# Patient Record
Sex: MALE | Race: ASIAN
[De-identification: names, ages, dates, MRNs, and addresses within clinical notes are randomized per-mention and may not be internally consistent; named-entity substitution may affect disease eponyms.]

---

## 2017-06-09 ENCOUNTER — HOSPITAL ENCOUNTER (EMERGENCY)
Dept: HOSPITAL 76 - ED | Age: 32
Discharge: HOME | End: 2017-06-09
Payer: MEDICAID

## 2017-06-09 VITALS — SYSTOLIC BLOOD PRESSURE: 124 MMHG | DIASTOLIC BLOOD PRESSURE: 88 MMHG

## 2017-06-09 DIAGNOSIS — B97.89: ICD-10-CM

## 2017-06-09 DIAGNOSIS — R03.0: ICD-10-CM

## 2017-06-09 DIAGNOSIS — Z87.891: ICD-10-CM

## 2017-06-09 DIAGNOSIS — J06.9: ICD-10-CM

## 2017-06-09 DIAGNOSIS — H66.002: ICD-10-CM

## 2017-06-09 DIAGNOSIS — J02.9: Primary | ICD-10-CM

## 2017-06-09 LAB — RAPID STREP SCREEN REAGENT QC: YELLOW

## 2017-06-09 PROCEDURE — 87070 CULTURE OTHR SPECIMN AEROBIC: CPT

## 2017-06-09 PROCEDURE — 87430 STREP A AG IA: CPT

## 2017-06-09 PROCEDURE — 99283 EMERGENCY DEPT VISIT LOW MDM: CPT

## 2017-06-09 NOTE — ED PHYSICIAN DOCUMENTATION
History of Present Illness





- Stated complaint


Stated Complaint: CONGESTION/EAR PX





- Chief complaint


Chief Complaint: Heent





- Additonal information


Additional information: 





hx from pt


31 male


5 days of cough congestion ear pain sore throat


no travel


sick co workers








Review of Systems


Constitutional: denies: Fever


Ears: reports: Ear pain


Nose: reports: Congestion


Throat: reports: Sore throat


Respiratory: reports: Cough


Immunocompromised: denies: Immunocompromised





PD PAST MEDICAL HISTORY





- Past Medical History


Past Medical History: No





- Past Surgical History


Past Surgical History: No


HEENT: Tonsil/Adenoidectomy





- Present Medications


Home Medications: 


 Ambulatory Orders











 Medication  Instructions  Recorded  Confirmed


 


Azithromycin [Zithromax] 250 mg PO DAILY #6 tablet 06/09/17 


 


Benzonatate [Tessalon] 100 mg PO TID PRN #20 capsule 06/09/17 


 


Fluticasone [Flonase] 1 sprays KEMAL BID PRN #1 bottle 06/09/17 














- Allergies


Allergies/Adverse Reactions: 


 Allergies











Allergy/AdvReac Type Severity Reaction Status Date / Time


 


Penicillins Allergy  Hives Verified 06/09/17 12:40


 


sulfamethoxazole Allergy  Hives Verified 06/09/17 12:40





[From Septra]     


 


trimethoprim [From Septra] Allergy  Hives Verified 06/09/17 12:40














- Social History


Does the pt smoke?: No


Smoking Status: Former smoker


Does the pt drink ETOH?: Yes


ETOH Use: Beer


Does the pt have substance abuse?: No





- Immunizations


Immunizations are current?: Yes





- POLST


Patient has POLST: No





PD ED PE NORMAL





- Vitals


Vital signs reviewed: Yes





- General


General: Alert and oriented X 3





- HEENT


HEENT: PERRL, Moist mucous membranes.  No: Ears normal (L AOM - dull red no 

landmarks, R benigh), Pharynx benign (erythema no exudate or swelling)





- Neck


Neck: Supple, no meningeal sign





- Cardiac


Cardiac: RRR





- Respiratory


Respiratory: No respiratory distress, Clear bilaterally





- Neuro


Neuro: Alert and oriented X 3





Results





- Vitals


Vitals: 





 Vital Signs - 24 hr











  06/09/17





  12:35


 


Temperature 36.5 C


 


Heart Rate 88


 


Respiratory 17





Rate 


 


Blood Pressure 124/88 H


 


O2 Saturation 96








 Oxygen











O2 Source                      Room air

















Departure





- Departure


Disposition: 01 Home, Self Care


Clinical Impression: 


Pharyngitis


Qualifiers:


 Pharyngitis/tonsillitis etiology: unspecified etiology Qualified Code(s): 

J02.9 - Acute pharyngitis, unspecified





Otitis media


Qualifiers:


 Otitis media type: suppurative Laterality: left Chronicity: acute Recurrence: 

not specified as recurrent Spontaneous tympanic membrane rupture: without 

spontaneous rupture Qualified Code(s): H66.002 - Acute suppurative otitis media 

without spontaneous rupture of ear drum, left ear





URI (upper respiratory infection)


Qualifiers:


 URI type: unspecified viral URI Qualified Code(s): J06.9 - Acute upper 

respiratory infection, unspecified; B97.89 - Other viral agents as the cause of 

diseases classified elsewhere


Condition: Good


Instructions:  ED Otitis Media Acute Adult


Prescriptions: 


Fluticasone [Flonase] 1 sprays KEMAL BID PRN #1 bottle


 PRN Reason: allergies


Benzonatate [Tessalon] 100 mg PO TID PRN #20 capsule


 PRN Reason: to ease cough


Azithromycin [Zithromax] 250 mg PO DAILY #6 tablet


Comments: 


Motrin and/or tylenol as needed for the pain





And please follow up with your PMD about your blood pressure - it was high today


Forms:  Activity restrictions

## 2018-01-22 ENCOUNTER — HOSPITAL ENCOUNTER (OUTPATIENT)
Dept: HOSPITAL 76 - DI | Age: 33
Discharge: HOME | End: 2018-01-22
Attending: FAMILY MEDICINE
Payer: MEDICAID

## 2018-01-22 DIAGNOSIS — N50.82: Primary | ICD-10-CM

## 2018-01-22 PROCEDURE — 76870 US EXAM SCROTUM: CPT

## 2018-01-22 NOTE — ULTRASOUND REPORT
DATE OF SERVICE: 01/22/2018

 

SCROTAL DUPLEX:  01/22/2018

 

CLINICAL INDICATION:  Pain, palpable abnormality, left side.

 

FINDINGS: The right testicle measures 4.6 x 3.2 x 2.3 cm, and the left testicle measures 4.2 x 

3.0 x 2.2 cm.  Both testicles demonstrate normal flow and echotexture.  No hydrocele, 

varicocele, or hernia is identified.  The epididymides are unremarkable.  The patient had a 

difficult time localizing a palpable abnormality.

 

IMPRESSION:  NORMAL SCROTAL DUPLEX.

 

 

DD: 01/22/2018 15:24

TD: 01/22/2018 19:57

Job #: [id number]

## 2018-02-24 ENCOUNTER — HOSPITAL ENCOUNTER (EMERGENCY)
Dept: HOSPITAL 76 - ED | Age: 33
Discharge: HOME | End: 2018-02-24
Payer: COMMERCIAL

## 2018-02-24 VITALS — DIASTOLIC BLOOD PRESSURE: 83 MMHG | SYSTOLIC BLOOD PRESSURE: 133 MMHG
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
## 2018-02-24 DIAGNOSIS — Z23: ICD-10-CM

## 2018-02-24 DIAGNOSIS — S61.213A: Primary | ICD-10-CM

## 2018-02-24 DIAGNOSIS — Z87.891: ICD-10-CM

## 2018-02-24 PROCEDURE — 90471 IMMUNIZATION ADMIN: CPT

## 2018-02-24 PROCEDURE — 1040M: CPT

## 2018-02-24 PROCEDURE — 99283 EMERGENCY DEPT VISIT LOW MDM: CPT

## 2018-02-24 PROCEDURE — 99282 EMERGENCY DEPT VISIT SF MDM: CPT

## 2018-02-24 PROCEDURE — 90715 TDAP VACCINE 7 YRS/> IM: CPT

## 2018-02-24 NOTE — ED PHYSICIAN DOCUMENTATION
PD HPI UPPER EXT INJURY





- Stated complaint


Stated Complaint: FINGER LAC





- Chief complaint


Chief Complaint: Laceration





- History obtained from


History obtained from: Patient





- History of Present Illness


Location: Left, Finger (3rd)


Type of injury: Other (avulsion)


Where injury occurred: Work ([employer])


Timing - onset: How many hours ago (1)


Timing - duration: Hours (1)


Timing - details: Abrupt onset


Pain level max: 3


Pain level now: 2


Improved by: Rest


Worsened by: Moving, Palpating


Associated symptoms: No: Weakness, Numbness, Tingling, Swelling


Contributing factors: No: Anticoagulated


Similar symptoms before: Has not had sx before





- Additonal information


Additional information: 





states cut finger on something as he was throwing it away at work.





Review of Systems


Constitutional: denies: Fever


GI: denies: Vomiting


Neurologic: denies: Focal weakness, Numbness





PD PAST MEDICAL HISTORY





- Past Medical History


Past Medical History: No





- Past Surgical History


Past Surgical History: No


HEENT: Tonsil/Adenoidectomy





- Present Medications


Home Medications: 


 Ambulatory Orders











 Medication  Instructions  Recorded  Confirmed


 


Azithromycin [Zithromax] 250 mg PO DAILY #6 tablet 06/09/17 


 


Benzonatate [Tessalon] 100 mg PO TID PRN #20 capsule 06/09/17 


 


Fluticasone [Flonase] 1 sprays KEMAL BID PRN #1 bottle 06/09/17 














- Allergies


Allergies/Adverse Reactions: 


 Allergies











Allergy/AdvReac Type Severity Reaction Status Date / Time


 


Penicillins Allergy  Hives Verified 02/24/18 21:24


 


sulfamethoxazole Allergy  Hives Verified 02/24/18 21:24





[From Septra]     


 


trimethoprim [From Septra] Allergy  Hives Verified 02/24/18 21:24














- Social History


Does the pt smoke?: No


Smoking Status: Former smoker


Does the pt drink ETOH?: Yes


Does the pt have substance abuse?: No





- Immunizations


Immunizations are current?: Yes


Immunizations: TDAP >10years/unknown





- POLST


Patient has POLST: No





PD ED PE NORMAL





- Vitals


Vital signs reviewed: Yes





- General


General: Alert and oriented X 3, No acute distress





- Derm


Derm: Warm and dry





- Extremities


Extremities: Other (L  hand - 3rd digit 0.2x0.3cm subcutaneous avulsion. NVI. 

skin is completely removed. )





- Neuro


Neuro: Alert and oriented X 3





Results





- Vitals


Vitals: 


 Vital Signs - 24 hr











  02/24/18





  21:20


 


Temperature 36.2 C L


 


Heart Rate 96


 


Respiratory 16





Rate 


 


Blood Pressure 133/83 H


 


O2 Saturation 97








 Oxygen











O2 Source                      Room air

















PD MEDICAL DECISION MAKING





- ED course


Complexity details: re-evaluated patient, considered differential, d/w patient


ED course: 





Patient is a 32-year-old male with an avulsion to the left third digit.  This 

was cleansed well, covered with Gelfoam and then wrapped in tube gauze.  

Warnings of infection and instructions on wound care given at bedside. Also 

counseled on how to minimize scarring.  Tdap given Patient counseled regarding 

signs and symptoms for which I believe and urgent re-evaluation would be 

necessary. Patient with good understanding of and agreement to plan and is 

comfortable going home at this time





This document was made in part using voice recognition software. While efforts 

are made to proofread this document, sound alike and grammatical errors may 

occur.





Departure





- Departure


Disposition: 01 Home, Self Care


Clinical Impression: 


Avulsion of finger


Qualifiers:


 Encounter type: initial encounter Qualified Code(s): S61.209A - Unspecified 

open wound of unspecified finger without damage to nail, initial encounter





Condition: Good


Instructions:  ED Avulsion Dermal


Follow-Up: 


Jake Luong MD [Primary Care Provider] - Within 3 Days (for wound check)


Comments: 


Keep the wound clean. It must be covered when at work. Return if you worsen, 

especially for redness, swelling or drainage from the wound. 


Forms:  Activity restrictions

## 2018-10-19 NOTE — ED PHYSICIAN DOCUMENTATION
PD HPI SKIN





- Stated complaint


Stated Complaint: RASH





- Chief complaint


Chief Complaint: Wound





- History obtained from


History obtained from: Patient





- History of Present Illness


Timing - onset: How many days ago (5)


Timing - duration: Days (5)


Timing - details: Gradual onset


Pain level max: 4


Pain level now: 3


Location: Other (R flank)


Quality / character: Painful, Burning, Raised


Improved by: Other (nothing)


Worsened by (comment): COMMENT (nothing)


Associated symptoms: No: Fever, Myalgias, Joint pain, Headache, Facial swelling,

Dyspnea, Abd pain, N/V/D, Urinary sx


Contributing factors: No: Exposed to medication, Exposed to food, Exposed to 

soap / lotion, Exposed to Poison ivy/oak, Insect bite /sting, Recent illness


Similar symptoms before: Has not had sx before


Recently seen: Not recently seen





Review of Systems


Constitutional: denies: Fever, Chills


GI: denies: Vomiting, Diarrhea


Musculoskeletal: denies: Neck pain, Back pain


Neurologic: denies: Headache





PD PAST MEDICAL HISTORY





- Past Medical History


Past Medical History: No





- Past Surgical History


Past Surgical History: No


HEENT: Tonsil/Adenoidectomy





- Present Medications


Home Medications: 


                                Ambulatory Orders











 Medication  Instructions  Recorded  Confirmed


 


Azithromycin [Zithromax] 250 mg PO DAILY #6 tablet 06/09/17 


 


Benzonatate [Tessalon] 100 mg PO TID PRN #20 capsule 06/09/17 


 


Fluticasone [Flonase] 1 sprays KEMAL BID PRN #1 bottle 06/09/17 


 


Valacyclovir HCl [Valtrex] 1,000 mg PO TID #21 tablet 10/19/18 














- Allergies


Allergies/Adverse Reactions: 


                                    Allergies











Allergy/AdvReac Type Severity Reaction Status Date / Time


 


Penicillins Allergy  Hives Verified 02/24/18 21:24


 


sulfamethoxazole Allergy  Hives Verified 02/24/18 21:24





[From Septra]     


 


trimethoprim [From Septra] Allergy  Hives Verified 02/24/18 21:24














- Social History


Does the pt smoke?: No


Smoking Status: Never smoker


Does the pt drink ETOH?: Yes


Does the pt have substance abuse?: No





- Immunizations


Immunizations are current?: Yes


Immunizations: TDAP >10years/unknown





- POLST


Patient has POLST: No





PD ED PE NORMAL





- Vitals


Vital signs reviewed: Yes





- General


General: Alert and oriented X 3





- HEENT


HEENT: Moist mucous membranes, Pharynx benign





- Neck


Neck: Supple, no meningeal sign, Other (Firm nodular area to the left lower 

thyroid.)





- Cardiac


Cardiac: RRR, Strong equal pulses





- Respiratory


Respiratory: No respiratory distress, Clear bilaterally





- Abdomen


Abdomen: Soft, Non tender, Non distended





- Derm


Derm: Warm and dry, Other (Dermatomal rash to the right flank, raised macular, 

erythematous with small scabs)





- Neuro


Neuro: Alert and oriented X 3





Results





- Vitals


Vitals: 


                               Vital Signs - 24 hr











  10/19/18





  16:08


 


Temperature 36.1 C L


 


Heart Rate 88


 


Respiratory 18





Rate 


 


Blood Pressure 143/84 H


 


O2 Saturation 96








                                     Oxygen











O2 Source                      Room air

















PD MEDICAL DECISION MAKING





- ED course


Complexity details: considered differential, d/w patient


ED course: 





Patient is a 33-year-old male who presents to the emergency department what 

appears to be shingles of the right flank.  He also is concerned about a 

swelling on the left side of the throat and it feels like a firm enlarged 

portion of his thyroid.  Recommend that he follow-up closely with his doctor for

ultrasound and further evaluation.  Will place on Valtrex.  Patient counseled 

regarding signs and symptoms for which I believe and urgent re-evaluation would 

be necessary. Patient with good understanding of and agreement to plan and is 

comfortable going home at this time





This document was made in part using voice recognition software. While efforts 

are made to proofread this document, sound alike and grammatical errors may 

occur.





Departure





- Departure


Disposition: 01 Home, Self Care


Clinical Impression: 


 Thyromegaly





Shingles


Qualifiers:


 Herpes zoster complications: without complications Qualified Code(s): B02.9 - 

Zoster without complications





Condition: Good


Instructions:  ED Shingles


Follow-Up: 


Jake Luong MD [Credentialed Staff Provider] - Within 1 week


Prescriptions: 


Valacyclovir HCl [Valtrex] 1,000 mg PO TID #21 tablet


Comments: 


Take the Valtrex until gone.  Return if you worsen.  This should improve over 

the next week.  You can use Motrin or Tylenol as needed for pain.  Keep the area

 covered when out in public or at work


Discharge Date/Time: 10/19/18 17:59

## 2018-11-08 NOTE — ULTRASOUND REPORT
Reason:  ENLARGED THYROID

Procedure Date:  11/07/2018   

Accession Number:  [id number] / [id number]                    

Procedure:  US  - Head or Neck Soft Tissue CPT Code:  

 

FULL RESULT:

 

 

EXAM:

THYROID ULTRASOUND

 

EXAM DATE: 11/7/2018 07:08 PM.

 

CLINICAL HISTORY: Enlarged thyroid.

 

COMPARISON: None.

 

TECHNIQUE: Real time sonographic imaging of the thyroid was performed by 

the sonographer. Multiple representative static images were saved for 

review.

 

FINDINGS:

THYROID GLAND:

Right Lobe: 4.5 x 1.6 x 1.8 cm, volume 6.8 cc.  Normal background 

echotexture.

Right Lobe Nodules: There is a 1 cm cyst with echogenic 3 mm mural nodule.

 

Left Lobe: 6.6 x 2.8 x 4.2 cm, volume 41 cc.  Normal background 

echotexture.

Left Lobe Nodules: There is a 4.6 x 2.8 x 3.9 cm heterogeneous vascular 

mass within the left lobe of the thyroid.

 

Isthmus: 0.3 cm AP.

Isthmic Nodules: None.

 

LYMPH NODES:

No adenopathy demonstrated in the central or lateral compartment.

 

OTHER: None.

 

IMPRESSION: The solid left thyroid lobe mass warrants tissue diagnosis by 

fine-needle aspiration.

 

Management recommendations are based on 2015 [employer] 

Management Guidelines for Adult Patients with Thyroid Nodules and 

Differentiated Thyroid Cancer.

 

[employer]